# Patient Record
Sex: MALE | Race: WHITE | NOT HISPANIC OR LATINO | URBAN - METROPOLITAN AREA
[De-identification: names, ages, dates, MRNs, and addresses within clinical notes are randomized per-mention and may not be internally consistent; named-entity substitution may affect disease eponyms.]

---

## 2023-05-22 ENCOUNTER — TELEPHONE (OUTPATIENT)
Dept: PRIMARY CARE | Facility: CLINIC | Age: 88
End: 2023-05-22

## 2023-05-22 ENCOUNTER — PATIENT OUTREACH (OUTPATIENT)
Dept: PRIMARY CARE | Facility: CLINIC | Age: 88
End: 2023-05-22

## 2023-05-22 ENCOUNTER — APPOINTMENT (OUTPATIENT)
Dept: PRIMARY CARE | Facility: CLINIC | Age: 88
End: 2023-05-22
Payer: MEDICARE

## 2023-05-22 NOTE — TELEPHONE ENCOUNTER
Bob, pt's son, left a msg stating that they cancelled dad's appt today as he was hospitalized and discharged to [facility] at  room B-12.  He said that dad will see you at .  He is also wanting to speak to you if you can call him when you get a chance.

## 2023-05-31 ENCOUNTER — NURSING HOME VISIT (OUTPATIENT)
Dept: PRIMARY CARE | Facility: CLINIC | Age: 88
End: 2023-05-31
Payer: MEDICARE

## 2023-05-31 DIAGNOSIS — E78.5 HYPERLIPIDEMIA, UNSPECIFIED HYPERLIPIDEMIA TYPE: ICD-10-CM

## 2023-05-31 DIAGNOSIS — G31.84 MILD COGNITIVE IMPAIRMENT: ICD-10-CM

## 2023-05-31 DIAGNOSIS — I77.9 CAROTID ARTERY DISEASE, UNSPECIFIED LATERALITY, UNSPECIFIED TYPE (CMS-HCC): ICD-10-CM

## 2023-05-31 DIAGNOSIS — I50.32 HYPERTENSIVE HEART DISEASE WITH CHRONIC DIASTOLIC CONGESTIVE HEART FAILURE (MULTI): ICD-10-CM

## 2023-05-31 DIAGNOSIS — I10 BENIGN ESSENTIAL HYPERTENSION: ICD-10-CM

## 2023-05-31 DIAGNOSIS — I11.0 HYPERTENSIVE HEART DISEASE WITH CHRONIC DIASTOLIC CONGESTIVE HEART FAILURE (MULTI): ICD-10-CM

## 2023-05-31 DIAGNOSIS — G25.0 ESSENTIAL TREMOR: ICD-10-CM

## 2023-05-31 DIAGNOSIS — I48.0 PAROXYSMAL ATRIAL FIBRILLATION (MULTI): Primary | ICD-10-CM

## 2023-05-31 DIAGNOSIS — D69.9 BLEEDING DISORDER (CMS-HCC): ICD-10-CM

## 2023-05-31 DIAGNOSIS — D69.6 THROMBOCYTOPENIA (CMS-HCC): ICD-10-CM

## 2023-05-31 PROBLEM — E55.9 MILD VITAMIN D DEFICIENCY: Status: ACTIVE | Noted: 2023-05-31

## 2023-05-31 PROBLEM — E83.42 HYPOMAGNESEMIA: Status: ACTIVE | Noted: 2023-05-18

## 2023-05-31 PROBLEM — Z86.73 HISTORY OF TIA (TRANSIENT ISCHEMIC ATTACK): Status: ACTIVE | Noted: 2023-05-18

## 2023-05-31 PROBLEM — G56.02 CARPAL TUNNEL SYNDROME OF LEFT WRIST: Status: ACTIVE | Noted: 2023-05-31

## 2023-05-31 PROBLEM — K21.9 GASTROESOPHAGEAL REFLUX DISEASE: Status: ACTIVE | Noted: 2023-05-31

## 2023-05-31 PROBLEM — R19.8 FULLNESS OF ABDOMEN: Status: ACTIVE | Noted: 2023-05-31

## 2023-05-31 PROCEDURE — 99309 SBSQ NF CARE MODERATE MDM 30: CPT | Performed by: INTERNAL MEDICINE

## 2023-05-31 RX ORDER — AMIODARONE HYDROCHLORIDE 200 MG/1
200 TABLET ORAL
COMMUNITY
Start: 2023-05-31

## 2023-05-31 RX ORDER — CLOPIDOGREL BISULFATE 75 MG/1
TABLET ORAL EVERY 24 HOURS
COMMUNITY
Start: 2014-10-01

## 2023-05-31 RX ORDER — PANTOPRAZOLE SODIUM 40 MG/1
40 TABLET, DELAYED RELEASE ORAL
COMMUNITY
Start: 2023-05-20

## 2023-05-31 RX ORDER — GABAPENTIN 100 MG/1
100 CAPSULE ORAL
COMMUNITY
Start: 2020-07-21

## 2023-05-31 RX ORDER — LISINOPRIL 10 MG/1
TABLET ORAL EVERY 24 HOURS
COMMUNITY
Start: 2014-09-08

## 2023-05-31 RX ORDER — MEMANTINE HYDROCHLORIDE 10 MG/1
1 TABLET ORAL 2 TIMES DAILY
COMMUNITY
Start: 2020-02-03

## 2023-05-31 RX ORDER — ATORVASTATIN CALCIUM 40 MG/1
TABLET, FILM COATED ORAL EVERY 24 HOURS
COMMUNITY

## 2023-05-31 RX ORDER — LANOLIN ALCOHOL/MO/W.PET/CERES
400 CREAM (GRAM) TOPICAL
COMMUNITY
Start: 2023-05-19

## 2023-05-31 NOTE — PROGRESS NOTES
Subjective   Patient ID: Milton Serna is a 88 y.o. male who presents for  admit SNF/fall/afib    HPI   discharge summary 5/13/23 reviewed  Around independent living apartment, unclear how long.  In ED with elevated CPK but without significant rhabdomyolysis.  Uncomplicated stay with the exception of new onset atrial fibrillation.  Still by cardiology started on oral anticoagulation as well as amiodarone.  Has been doing well since.  Did have hematoma in leg, went to ED normal CT scan except hematoma.  Slowly improving.  Pain controlled at this point.  No chest pain or shortness of breath.  No falls.  No bleeding.  Labs and scans reviewed.  Notable for hemoglobin of approximately 9.5 on last few visits.  Otherwise normal exam and labs.      Review of Systems   All other systems reviewed and are negative.      Objective   There were no vitals taken for this visit.    Physical Exam  Constitutional:       Appearance: Normal appearance.   Cardiovascular:      Rate and Rhythm: Normal rate and regular rhythm.      Pulses: Normal pulses.      Heart sounds: No murmur heard.     No gallop.   Pulmonary:      Effort: Pulmonary effort is normal. No respiratory distress.      Breath sounds: Normal breath sounds. No wheezing, rhonchi or rales.   Neurological:      Mental Status: He is alert.   Psychiatric:         Mood and Affect: Mood normal.         Behavior: Behavior normal.         Thought Content: Thought content normal.         Judgment: Judgment normal.         Assessment/Plan     #1- CTS--> resolved. Continue splints PRN, follow-up when necessary. #2- lightheadedness--> resolved  #2- new afib.  Controlled.  On OAC/amio.  f/u  cards, appt pending.  Labs. Fallp precautions.   ??plavix + OAC--> con't pending cards eval.   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 long-term issue. Normal CT. egd/FS 1/8/19. Continue fiber supplement. follow-up with Dr. Desai (surgery) and GI dr dunlap. Asked patient to go  to ER any significant progression  #4- CHAPINCITO- rec he con't CPAP  #5- anxiety disorder- stable off treatment. Follow  #6- TIA- follow-up neurology. Stable.  On OAC (afib)  #7- MCI- stable/slowly progressive. follow-up with neurology (Dr Helms) when necessary. reviewed safety precautions. Continue Namenda  10 mg twice a day. Unable to tolerate Aricept/Exelon.   #6-Qwhmazjpgcjlxy-juaaqwn why off rx, retest.  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 appear simple on last scan, patient historically declined follow-up  #10- Diverticulitis-clinically stable. follow-up surgery and GI   #11- rt temple skin lesion- follow-up w/ derm.  #12 imbalance- better.   #13 knee pain- follow-up orthopedist . We will try diclofenac gel.  #14 tremor- much improved with discontinuing SRI. s/p remote neuro eval. f/u neuro if any progression  #15 hypertension-controlled. Continue treatment  #16 mild thrombocytopenia-continue to follow. Has been stable for quite some time. Retest next visit  #17 bl LE neuropathic pain- reviewed. normal exam. will order NCS/EMG--he declines. .  #18 rt/lt hip pain- ? SI issue. f/u Dr Morrow. better overall.   #19 leg hematoma- stable.   #20 anemia- likely due to hematoma- retest cbc    Con't PT/OT

## 2023-06-07 ENCOUNTER — TELEPHONE (OUTPATIENT)
Dept: PRIMARY CARE | Facility: CLINIC | Age: 88
End: 2023-06-07
Payer: MEDICARE

## 2023-06-07 NOTE — TELEPHONE ENCOUNTER
Leanna, nurse from [facility] called, left msg, stating patient is not feeling good today.  He is having hip pain, still with edema in LE's, hearing clear lungs, but concern is that he is puffy in the face  and is clearing his throat a lot with congestion.  He is a poor historian, but has h/o rhabdomyolysis, TIA, HTN, a-fib, cholesterol, and CAD.  Would you want any testing done?  CXR?  Labs?  Urine?    Call back at [phone].  
Appropriate/Calm